# Patient Record
Sex: FEMALE | Race: BLACK OR AFRICAN AMERICAN | NOT HISPANIC OR LATINO | Employment: OTHER | URBAN - METROPOLITAN AREA
[De-identification: names, ages, dates, MRNs, and addresses within clinical notes are randomized per-mention and may not be internally consistent; named-entity substitution may affect disease eponyms.]

---

## 2019-08-22 ENCOUNTER — HOSPITAL ENCOUNTER (EMERGENCY)
Facility: HOSPITAL | Age: 64
Discharge: HOME | End: 2019-08-22
Attending: EMERGENCY MEDICINE | Admitting: EMERGENCY MEDICINE
Payer: COMMERCIAL

## 2019-08-22 VITALS
TEMPERATURE: 97.9 F | HEART RATE: 98 BPM | WEIGHT: 112.1 LBS | SYSTOLIC BLOOD PRESSURE: 153 MMHG | RESPIRATION RATE: 16 BRPM | OXYGEN SATURATION: 99 % | DIASTOLIC BLOOD PRESSURE: 95 MMHG

## 2019-08-22 DIAGNOSIS — R53.1 GENERALIZED WEAKNESS: ICD-10-CM

## 2019-08-22 DIAGNOSIS — R68.89 MULTIPLE COMPLAINTS: Primary | ICD-10-CM

## 2019-08-22 DIAGNOSIS — R20.2 PARESTHESIA OF RIGHT UPPER EXTREMITY: ICD-10-CM

## 2019-08-22 LAB
ALBUMIN SERPL BCP-MCNC: 4.4 G/DL (ref 3–5.2)
ALP SERPL-CCNC: 52 U/L (ref 43–122)
ALT SERPL W P-5'-P-CCNC: 16 U/L (ref 9–52)
ANION GAP SERPL CALCULATED.3IONS-SCNC: 5 MMOL/L (ref 5–14)
AST SERPL W P-5'-P-CCNC: 23 U/L (ref 14–36)
BACTERIA UR QL AUTO: ABNORMAL /HPF
BASOPHILS # BLD AUTO: 0.1 THOUSANDS/ΜL (ref 0–0.1)
BASOPHILS NFR BLD AUTO: 2 % (ref 0–1)
BILIRUB SERPL-MCNC: 0.4 MG/DL
BILIRUB UR QL STRIP: NEGATIVE
BUN SERPL-MCNC: 13 MG/DL (ref 5–25)
CALCIUM SERPL-MCNC: 9.7 MG/DL (ref 8.4–10.2)
CHLORIDE SERPL-SCNC: 105 MMOL/L (ref 97–108)
CLARITY UR: CLEAR
CO2 SERPL-SCNC: 31 MMOL/L (ref 22–30)
COLOR UR: YELLOW
CREAT SERPL-MCNC: 0.84 MG/DL (ref 0.6–1.2)
EOSINOPHIL # BLD AUTO: 0.2 THOUSAND/ΜL (ref 0–0.4)
EOSINOPHIL NFR BLD AUTO: 4 % (ref 0–6)
ERYTHROCYTE [DISTWIDTH] IN BLOOD BY AUTOMATED COUNT: 13.7 %
GFR SERPL CREATININE-BSD FRML MDRD: 86 ML/MIN/1.73SQ M
GLUCOSE SERPL-MCNC: 94 MG/DL (ref 70–99)
GLUCOSE UR STRIP-MCNC: NEGATIVE MG/DL
HCT VFR BLD AUTO: 39.4 % (ref 36–46)
HGB BLD-MCNC: 13.1 G/DL (ref 12–16)
HGB UR QL STRIP.AUTO: 150
KETONES UR STRIP-MCNC: NEGATIVE MG/DL
LEUKOCYTE ESTERASE UR QL STRIP: 25
LIPASE SERPL-CCNC: 98 U/L (ref 23–300)
LYMPHOCYTES # BLD AUTO: 2.1 THOUSANDS/ΜL (ref 0.5–4)
LYMPHOCYTES NFR BLD AUTO: 38 % (ref 25–45)
MCH RBC QN AUTO: 30.1 PG (ref 26–34)
MCHC RBC AUTO-ENTMCNC: 33.2 G/DL (ref 31–36)
MCV RBC AUTO: 91 FL (ref 80–100)
MONOCYTES # BLD AUTO: 0.4 THOUSAND/ΜL (ref 0.2–0.9)
MONOCYTES NFR BLD AUTO: 8 % (ref 1–10)
MUCOUS THREADS UR QL AUTO: ABNORMAL
NEUTROPHILS # BLD AUTO: 2.7 THOUSANDS/ΜL (ref 1.8–7.8)
NEUTS SEG NFR BLD AUTO: 49 % (ref 45–65)
NITRITE UR QL STRIP: NEGATIVE
NON-SQ EPI CELLS URNS QL MICRO: ABNORMAL /HPF
PH UR STRIP.AUTO: 6 [PH]
PLATELET # BLD AUTO: 292 THOUSANDS/UL (ref 150–450)
PMV BLD AUTO: 7.2 FL (ref 8.9–12.7)
POTASSIUM SERPL-SCNC: 3.8 MMOL/L (ref 3.6–5)
PROT SERPL-MCNC: 7.7 G/DL (ref 5.9–8.4)
PROT UR STRIP-MCNC: NEGATIVE MG/DL
RBC # BLD AUTO: 4.34 MILLION/UL (ref 4–5.2)
RBC #/AREA URNS AUTO: ABNORMAL /HPF
SODIUM SERPL-SCNC: 141 MMOL/L (ref 137–147)
SP GR UR STRIP.AUTO: 1.01 (ref 1–1.04)
TROPONIN I SERPL-MCNC: <0.01 NG/ML (ref 0–0.03)
UROBILINOGEN UA: NEGATIVE MG/DL
WBC # BLD AUTO: 5.5 THOUSAND/UL (ref 4.5–11)
WBC #/AREA URNS AUTO: ABNORMAL /HPF

## 2019-08-22 PROCEDURE — 99283 EMERGENCY DEPT VISIT LOW MDM: CPT | Performed by: PHYSICIAN ASSISTANT

## 2019-08-22 PROCEDURE — 99285 EMERGENCY DEPT VISIT HI MDM: CPT

## 2019-08-22 PROCEDURE — 36415 COLL VENOUS BLD VENIPUNCTURE: CPT | Performed by: PHYSICIAN ASSISTANT

## 2019-08-22 PROCEDURE — 85025 COMPLETE CBC W/AUTO DIFF WBC: CPT | Performed by: PHYSICIAN ASSISTANT

## 2019-08-22 PROCEDURE — 93005 ELECTROCARDIOGRAM TRACING: CPT

## 2019-08-22 PROCEDURE — 83690 ASSAY OF LIPASE: CPT | Performed by: PHYSICIAN ASSISTANT

## 2019-08-22 PROCEDURE — 84484 ASSAY OF TROPONIN QUANT: CPT | Performed by: PHYSICIAN ASSISTANT

## 2019-08-22 PROCEDURE — 80053 COMPREHEN METABOLIC PANEL: CPT | Performed by: PHYSICIAN ASSISTANT

## 2019-08-22 PROCEDURE — 81001 URINALYSIS AUTO W/SCOPE: CPT | Performed by: PHYSICIAN ASSISTANT

## 2019-08-22 RX ORDER — PRAVASTATIN SODIUM 40 MG
40 TABLET ORAL EVERY OTHER DAY
COMMUNITY
Start: 2019-07-10 | End: 2019-09-06 | Stop reason: SINTOL

## 2019-08-22 RX ORDER — FLUTICASONE FUROATE AND VILANTEROL 200; 25 UG/1; UG/1
1 POWDER RESPIRATORY (INHALATION) DAILY
COMMUNITY
Start: 2019-05-16

## 2019-08-22 RX ORDER — PANTOPRAZOLE SODIUM 20 MG/1
20 TABLET, DELAYED RELEASE ORAL DAILY
COMMUNITY
Start: 2018-11-22

## 2019-08-22 RX ORDER — ALBUTEROL SULFATE 90 UG/1
2 AEROSOL, METERED RESPIRATORY (INHALATION) EVERY 6 HOURS PRN
COMMUNITY
Start: 2018-11-19 | End: 2019-11-19

## 2019-08-22 RX ORDER — FLUTICASONE PROPIONATE 50 MCG
2 SPRAY, SUSPENSION (ML) NASAL DAILY
COMMUNITY
Start: 2019-05-16 | End: 2020-05-15

## 2019-08-22 RX ORDER — LORATADINE 10 MG/1
10 TABLET ORAL DAILY
COMMUNITY
Start: 2018-11-22

## 2019-08-22 RX ORDER — ONDANSETRON 4 MG/1
4 TABLET, ORALLY DISINTEGRATING ORAL EVERY 8 HOURS PRN
Qty: 20 TABLET | Refills: 0 | Status: SHIPPED | OUTPATIENT
Start: 2019-08-22 | End: 2019-09-01

## 2019-08-22 NOTE — ED PROVIDER NOTES
History  Chief Complaint   Patient presents with    Weakness - Generalized     pt  with many c/o's - lost sense of smell, lost sense of taste , not eating, losing weight, have an enlarged liver , feel weak all over , rt  arm felt numb yesterday - doesn't like her doctor - one part of my leg feels like that the blood doesn't circulat     Patient is a 27-year-old female who presents today with multiple chronic complaints  Patient is complaining of decreased appetite over the past year, right-sided leg pain over the past 3 years and changes to activity level over the past year as well  Patient reports she is most concerned for an episode of numbness and tingling in the right antecubital fossa which occurred yesterday  Patient reports she has had multiple occurrences of this numbness and tingling previously  Patient reports she is also concerned she was diagnosed with an enlarged liver and has had a decreased appetite over the past year and reports she has been losing weight  Patient reports she has had episodes of nausea associated with this weight loss however denies any vomiting, diarrhea, constipation, crow-colored stools  Or abdominal pain  Patient reports she does not have any complaints here today and is reporting that she does not have a good relationship with her family care doctor and would like information for a different doctor  Patient denies any headaches, lightheadedness, dizziness, chest pain, shortness of breath, abdominal pain, dysuria, hematuria, flank pain, fevers, chills, rashes  Patient is reporting she does not have any symptoms at evaluation here today        History provided by:  Patient   used: No    Malaise - 7 years or greater   Severity:  Mild  Onset quality:  Gradual  Duration:  156 weeks  Timing:  Constant  Progression:  Unchanged  Chronicity:  New  Relieved by:  None tried  Worsened by:  Nothing  Ineffective treatments:  None tried  Associated symptoms: nausea ( associated with 'forcing' herself to eat large amoutns of food)    Associated symptoms: no abdominal pain, no chest pain, no dizziness, no dysuria, no fever, no shortness of breath and no vomiting        Prior to Admission Medications   Prescriptions Last Dose Informant Patient Reported? Taking? albuterol (PROVENTIL HFA,VENTOLIN HFA) 90 mcg/act inhaler Past Week at Unknown time  Yes Yes   Sig: Inhale 2 puffs every 6 (six) hours as needed   fluticasone (FLONASE) 50 mcg/act nasal spray Past Month at Unknown time  Yes Yes   Si sprays into each nostril daily   fluticasone-vilanterol (BREO ELLIPTA) 200-25 MCG/INH inhaler 2019 at Unknown time  Yes Yes   Sig: Inhale 1 puff daily   loratadine (CLARITIN) 10 mg tablet Past Week at Unknown time  Yes Yes   Sig: Take 10 mg by mouth daily   pantoprazole (PROTONIX) 20 mg tablet 2019 at Unknown time  Yes Yes   Sig: Take 20 mg by mouth daily   pravastatin (PRAVACHOL) 40 mg tablet 2019 at Unknown time  Yes Yes   Sig: Take 40 mg by mouth daily      Facility-Administered Medications: None       Past Medical History:   Diagnosis Date    Asthma     Enlarged liver        History reviewed  No pertinent surgical history  History reviewed  No pertinent family history  I have reviewed and agree with the history as documented  Social History     Tobacco Use    Smoking status: Never Smoker    Smokeless tobacco: Never Used   Substance Use Topics    Alcohol use: Not Currently     Frequency: Never    Drug use: Never        Review of Systems   Constitutional: Positive for appetite change, fatigue and unexpected weight change ( weight loss over past year)  Negative for chills and fever  HENT: Negative for congestion, ear pain, rhinorrhea and sore throat  Eyes: Negative for redness  Respiratory: Negative for chest tightness and shortness of breath  Cardiovascular: Negative for chest pain and palpitations     Gastrointestinal: Positive for nausea ( associated with 'forcing' herself to eat large amoutns of food)  Negative for abdominal pain and vomiting  Genitourinary: Negative for dysuria and hematuria  Musculoskeletal: Negative  Skin: Negative for rash  Neurological: Positive for weakness ( generalized) and numbness ( and tingling in right ac fossa less than 1 day multiple occurrences over past year)  Negative for dizziness, syncope and light-headedness  Hematological: Negative for adenopathy  Does not bruise/bleed easily  Physical Exam  Physical Exam   Constitutional: She is oriented to person, place, and time  She appears well-developed and well-nourished  Well-appearing  female in no acute distress   HENT:   Head: Normocephalic  Eyes: No scleral icterus  Cardiovascular: Normal rate and regular rhythm  Pulmonary/Chest: Effort normal and breath sounds normal  No stridor  Abdominal: Soft  She exhibits no distension  There is no tenderness  Musculoskeletal: Normal range of motion  Neurological: She is alert and oriented to person, place, and time  Skin: Skin is warm and dry  Capillary refill takes less than 2 seconds  Psychiatric: She has a normal mood and affect  Nursing note and vitals reviewed        Vital Signs  ED Triage Vitals [08/22/19 1459]   Temperature Pulse Respirations Blood Pressure SpO2   97 9 °F (36 6 °C) 98 16 153/95 99 %      Temp Source Heart Rate Source Patient Position - Orthostatic VS BP Location FiO2 (%)   Tympanic Monitor Sitting Left arm --      Pain Score       --           Vitals:    08/22/19 1459   BP: 153/95   Pulse: 98   Patient Position - Orthostatic VS: Sitting         Visual Acuity      ED Medications  Medications - No data to display    Diagnostic Studies  Results Reviewed     Procedure Component Value Units Date/Time    Urine Microscopic [[id number]]  (Abnormal) Collected:  08/22/19 1435    Lab Status:  Final result Specimen:  Urine, Clean Catch Updated:  08/22/19 4150 RBC, UA 4-10 /hpf      WBC, UA 1-2 /hpf      Epithelial Cells Occasional /hpf      Bacteria, UA Occasional /hpf      MUCUS THREADS Occasional    Troponin I [[id number]]  (Normal) Collected:  08/22/19 1542    Lab Status:  Final result Specimen:  Blood from Arm, Right Updated:  08/22/19 1617     Troponin I <0 01 ng/mL     UA w Reflex to Microscopic [[id number]]  (Abnormal) Collected:  08/22/19 1555    Lab Status:  Final result Specimen:  Urine, Clean Catch Updated:  08/22/19 1614     Color, UA Yellow     Clarity, UA Clear     Specific Gravity, UA 1 015     pH, UA 6 0     Leukocytes, UA 25 0     Nitrite, UA Negative     Protein, UA Negative mg/dl      Glucose, UA Negative mg/dl      Ketones, UA Negative mg/dl      Bilirubin, UA Negative     Blood,  0     UROBILINOGEN UA Negative mg/dL     Lipase [[id number]]  (Normal) Collected:  08/22/19 1542    Lab Status:  Final result Specimen:  Blood from Arm, Right Updated:  08/22/19 1605     Lipase 98 u/L     Comprehensive metabolic panel [[id number]]  (Abnormal) Collected:  08/22/19 1542    Lab Status:  Final result Specimen:  Blood from Arm, Right Updated:  08/22/19 1605     Sodium 141 mmol/L      Potassium 3 8 mmol/L      Chloride 105 mmol/L      CO2 31 mmol/L      ANION GAP 5 mmol/L      BUN 13 mg/dL      Creatinine 0 84 mg/dL      Glucose 94 mg/dL      Calcium 9 7 mg/dL      AST 23 U/L      ALT 16 U/L      Alkaline Phosphatase 52 U/L      Total Protein 7 7 g/dL      Albumin 4 4 g/dL      Total Bilirubin 0 40 mg/dL      eGFR 86 ml/min/1 73sq m     Narrative:       [facility] [place] operated by [facility] guidelines for Chronic Kidney Disease (CKD):     Stage 1 with normal or high GFR (GFR > 90 mL/min/1 73 square meters)    Stage 2 Mild CKD (GFR = 60-89 mL/min/1 73 square meters)    Stage 3A Moderate CKD (GFR = 45-59 mL/min/1 73 square meters)    Stage 3B Moderate CKD (GFR = 30-44 mL/min/1 73 square meters)    Stage 4 Severe CKD (GFR = 15-29 mL/min/1 73 square meters)   Stage 5 End Stage CKD (GFR <15 mL/min/1 73 square meters)  Note: GFR calculation is accurate only with a steady state creatinine    CBC and differential [[id number]]  (Abnormal) Collected:  08/22/19 1542    Lab Status:  Final result Specimen:  Blood from Arm, Right Updated:  08/22/19 1601     WBC 5 50 Thousand/uL      RBC 4 34 Million/uL      Hemoglobin 13 1 g/dL      Hematocrit 39 4 %      MCV 91 fL      MCH 30 1 pg      MCHC 33 2 g/dL      RDW 13 7 %      MPV 7 2 fL      Platelets 714 Thousands/uL      Neutrophils Relative 49 %      Lymphocytes Relative 38 %      Monocytes Relative 8 %      Eosinophils Relative 4 %      Basophils Relative 2 %      Neutrophils Absolute 2 70 Thousands/µL      Lymphocytes Absolute 2 10 Thousands/µL      Monocytes Absolute 0 40 Thousand/µL      Eosinophils Absolute 0 20 Thousand/µL      Basophils Absolute 0 10 Thousands/µL                  No orders to display              Procedures  ECG 12 Lead Documentation Only  Date/Time: 8/22/2019 5:37 PM  Performed by: Edith June PA-C  Authorized by: Edith June PA-C     Indications / Diagnosis:  Episode of numbness / tingling   ECG reviewed by me, the ED Provider: no    Patient location:  ED  Previous ECG:     Previous ECG:  Unavailable    Comparison to cardiac monitor: No    Interpretation:     Interpretation: normal    Rate:     ECG rate:  76    ECG rate assessment: normal    Rhythm:     Rhythm: sinus rhythm    Ectopy:     Ectopy: none    QRS:     QRS axis:  Left    QRS intervals:  Normal  Conduction:     Conduction: normal    ST segments:     ST segments:  Normal  T waves:     T waves: normal    Comments:                 ED Course             Stroke Assessment     Row Name 08/22/19 1535             NIH Stroke Scale    Interval  Baseline      Level of Consciousness (1a )  0      LOC Questions (1b )  0      LOC Commands (1c )  0      Best Gaze (2 )  0      Visual (3 )  0      Facial Palsy (4 )  0      Motor Arm, Left (5a )  0      Motor Arm, Right (5b )  0      Motor Leg, Left (6a )  0      Motor Leg, Right (6b )  0      Limb Ataxia (7 )  0      Sensory (8 )  0      Best Language (9 )  0      Dysarthria (10 )  0      Extinction and Inattention (11 ) (Formerly Neglect)  0      Total  0          First Filed Value   TPA Decision  Patient not a TPA candidate  Patient is not a candidate options  Symptoms resolved/clearly non disabling  MDM    Disposition  Final diagnoses:   Multiple complaints   Generalized weakness   Paresthesia of right upper extremity     Time reflects when diagnosis was documented in both MDM as applicable and the Disposition within this note     Time User Action Codes Description Comment    8/22/2019  4:18 PM Cynthia Thorne [R68 89] Multiple complaints     8/22/2019  4:18 PM Liudmila Hualyssa Add [R53 1] Generalized weakness     8/22/2019  4:19 PM Ginger Paigeanita [R20 2] Paresthesia of right upper extremity       ED Disposition     ED Disposition Condition Date/Time Comment    Discharge Good Thu Aug 22, 2019  4:18 PM Brendolyn Signs discharge to home/self care              Follow-up Information     Follow up With Specialties Details Why Contact Info Additional Information    [address] [facility] Schedule an appointment as soon as possible for a visit in 2 days  [address] [id number] [phone] [facility], [address] [place], [place], [phone]          Discharge Medication List as of 8/22/2019  4:21 PM      CONTINUE these medications which have NOT CHANGED    Details   albuterol (PROVENTIL HFA,VENTOLIN HFA) 90 mcg/act inhaler Inhale 2 puffs every 6 (six) hours as needed, Starting Mon 11/19/2018, Until Tue 11/19/2019, Historical Med      fluticasone (FLONASE) 50 mcg/act nasal spray 2 sprays into each nostril daily, Starting Thu 5/16/2019, Until Fri 5/15/2020, Historical Med      fluticasone-vilanterol (BREO ELLIPTA) 200-25 MCG/INH inhaler Inhale 1 puff daily, Starting Thu 5/16/2019, Historical Med      loratadine (CLARITIN) 10 mg tablet Take 10 mg by mouth daily, Starting Thu 11/22/2018, Historical Med      pantoprazole (PROTONIX) 20 mg tablet Take 20 mg by mouth daily, Starting Thu 11/22/2018, Historical Med      pravastatin (PRAVACHOL) 40 mg tablet Take 40 mg by mouth daily, Starting Wed 7/10/2019, Historical Med           No discharge procedures on file      ED Provider  Electronically Signed by           Lexi Yeager PA-C  08/22/19 6173

## 2019-08-23 LAB
ATRIAL RATE: 76 BPM
P AXIS: 50 DEGREES
PR INTERVAL: 180 MS
QRS AXIS: -6 DEGREES
QRSD INTERVAL: 98 MS
QT INTERVAL: 418 MS
QTC INTERVAL: 470 MS
T WAVE AXIS: 25 DEGREES
VENTRICULAR RATE: 76 BPM

## 2019-08-23 PROCEDURE — 93010 ELECTROCARDIOGRAM REPORT: CPT | Performed by: INTERNAL MEDICINE

## 2019-09-06 ENCOUNTER — OFFICE VISIT (OUTPATIENT)
Dept: FAMILY MEDICINE CLINIC | Facility: CLINIC | Age: 64
End: 2019-09-06
Payer: COMMERCIAL

## 2019-09-06 VITALS
HEART RATE: 83 BPM | BODY MASS INDEX: 22.98 KG/M2 | OXYGEN SATURATION: 100 % | WEIGHT: 114 LBS | HEIGHT: 59 IN | DIASTOLIC BLOOD PRESSURE: 68 MMHG | SYSTOLIC BLOOD PRESSURE: 128 MMHG

## 2019-09-06 DIAGNOSIS — R16.0 LARGE LIVER: ICD-10-CM

## 2019-09-06 DIAGNOSIS — Z86.59 PERSONAL HISTORY OF MENTAL DISORDER: ICD-10-CM

## 2019-09-06 DIAGNOSIS — Z12.31 ENCOUNTER FOR SCREENING MAMMOGRAM FOR BREAST CANCER: ICD-10-CM

## 2019-09-06 DIAGNOSIS — J30.1 SEASONAL ALLERGIC RHINITIS DUE TO POLLEN: ICD-10-CM

## 2019-09-06 DIAGNOSIS — N63.20 BREAST MASS, LEFT: ICD-10-CM

## 2019-09-06 DIAGNOSIS — E78.49 OTHER HYPERLIPIDEMIA: ICD-10-CM

## 2019-09-06 DIAGNOSIS — J45.41 MODERATE PERSISTENT ASTHMA WITH ACUTE EXACERBATION: ICD-10-CM

## 2019-09-06 DIAGNOSIS — R63.4 ABNORMAL WEIGHT LOSS: Primary | ICD-10-CM

## 2019-09-06 PROBLEM — K62.5 BLOOD PER RECTUM: Status: ACTIVE | Noted: 2018-04-12

## 2019-09-06 PROBLEM — R13.10 DYSPHAGIA: Status: ACTIVE | Noted: 2018-07-09

## 2019-09-06 PROBLEM — J98.4 RESTRICTIVE LUNG DISEASE: Status: ACTIVE | Noted: 2019-01-29

## 2019-09-06 PROBLEM — R51.9 CHRONIC HEADACHE: Status: ACTIVE | Noted: 2018-04-12

## 2019-09-06 PROBLEM — K21.9 GERD (GASTROESOPHAGEAL REFLUX DISEASE): Status: ACTIVE | Noted: 2018-11-19

## 2019-09-06 PROBLEM — R87.810 CERVICAL HIGH RISK HPV (HUMAN PAPILLOMAVIRUS) TEST POSITIVE: Status: ACTIVE | Noted: 2018-09-13

## 2019-09-06 PROBLEM — G89.29 CHRONIC HEADACHE: Status: ACTIVE | Noted: 2018-04-12

## 2019-09-06 PROBLEM — R05.9 COUGH: Status: ACTIVE | Noted: 2018-04-12

## 2019-09-06 PROBLEM — K42.9 UMBILICAL HERNIA WITHOUT OBSTRUCTION AND WITHOUT GANGRENE: Status: ACTIVE | Noted: 2017-12-19

## 2019-09-06 PROBLEM — M25.50 JOINT PAIN: Status: ACTIVE | Noted: 2018-11-19

## 2019-09-06 PROBLEM — Z12.11 ENCOUNTER FOR SCREENING FOR MALIGNANT NEOPLASM OF COLON: Status: ACTIVE | Noted: 2018-07-09

## 2019-09-06 PROBLEM — M79.89 AXILLARY SWELLING: Status: ACTIVE | Noted: 2018-05-10

## 2019-09-06 PROCEDURE — 3008F BODY MASS INDEX DOCD: CPT | Performed by: INTERNAL MEDICINE

## 2019-09-06 PROCEDURE — 99215 OFFICE O/P EST HI 40 MIN: CPT | Performed by: INTERNAL MEDICINE

## 2019-09-06 NOTE — PROGRESS NOTES
Assessment/Plan: This 27-year-old female presented with multiple somatic complaints and anxiety about multiple tests in the past that she says were not fully explained to her  I took will lot of time to review the patient's tests  I spent in excess of 40 minutes with the patient with greater than 50% of the time spent counseling her  I review all her past studies with her that were done at [facility] in the past her MRIs and CT scans and ultrasounds  She does not have lymphadenopathy she has normal size lymph nodes on the studies  She does not have a breast mass but has what is described as an large tail of the left breast   She is due for mammogram at this time which we will obtain half necessary they will do further studies  I reassured the patient that her other testing was normal and carefully explained this to her  She does have history of a an enlarged liver with the presence of multiple small colloid cysts on CT scan at [facility] in 2017 which according to the radiology department did not require follow-up  Liver function tests were ordered at this time and will be reviewed with the patient at her next visit  The patient appears depressed and over burned with the care of her mother who she is very concerned about and who has Alzheimer's is at least moderately severe  It is very possible that the recurrence of the patient's depression has been triggered and possibility present in the LEs severe for fashion for many years especially since she stopped her prior medication several years ago  She is now refusing medication and I told her we could discuss this further if she wished  Diet reviewed  Lifestyle modifications reviewed  Medications reviewed and ordered  Laboratory tests and studies reviewed and ordered  All patient's questions answered to patient satisfaction      Chief Complaint   Patient presents with   [address]     pt was going to [facility] clinic and was not happy with the dr's    poor sence of smell     past 6 months    lack of appitite     past 6 months    Weight Loss     past 6 months    Lymphadenopathy     pt reports having them under her arm and in neck   Fatigue         Diagnoses and all orders for this visit:    Abnormal weight loss  Comments:  loss of appetite and smell and taste  Told her may be depression , will check thyroid first  Put on wt gaining diet  2-3000 donald /d  Orders:  -     TSH, 3rd generation with Free T4 reflex; Future  -     Comprehensive metabolic panel; Future  -     CBC and differential; Future  -     LDL cholesterol, direct; Future    Encounter for screening mammogram for breast cancer  -     Mammo screening bilateral w 3d & cad; Future    Personal history of mental disorder  Comments:  hx of depression aand bipolar disorder, eventually stopped welbutrin and risperdol  Likely depressed again , may accout for wt loss, declining medical therapy  Other hyperlipidemia  Comments:  check off pravastatin which pt refuses  Orders:  -     TSH, 3rd generation with Free T4 reflex; Future  -     Comprehensive metabolic panel; Future  -     CBC and differential; Future  -     LDL cholesterol, direct; Future    Large liver  Comments:  hepatomegaly with small colloid cysts 2017 [facility]    Moderate persistent asthma with acute exacerbation    Seasonal allergic rhinitis due to pollen    Breast mass, left        Subjective: This 26-year-old female is seen for the following: This is a 1st visit and the patient multiple somatic complaints which required that I review all of her studies from [facility] dating back several years with her and convince her that the results were unrevealing as she was convinced that she was dying  She has apparently been very upset about this as well as the fact that she is caring for her elderly mother who lives with her and has Alzheimer's at least moderately advanced    She has lost 35 lb in approximately 6 months to year  She is now at a normal weight  She states that food does not taste good and she has lost her sense of taste and smell and does need very much  She has a past history of severe depression and possibly bipolar disorder and was on Risperdal and Wellbutrin for many years in the past and stopped it several years ago on her own  She moved to the [facility] from the [facility] work area 6 months ago  She appears to be paranoia about people who live around her and her landlord and does not want anything to do with other people  She has not wanted to hurt herself and has never attempted to hurt herself  She has a history of elevated cholesterol past has been on pravastatin but says that she was concerned because she is having trouble remembering things that the pravastatin might have been causing this  Last time a cholesterol check was checked her LDL is 133 on 40 mg of pravastatin every other day  She has a history of mild asthma and uses Breo as needed  She also has nasal allergies and nasal obstruction which may affect her smell  This is worse since she has lived in the [facility] and for the year prior to moving here she had a mold problems to her apartment  Patient ID: Gaston Bateman is a 59 y o  female          Current Outpatient Medications:     albuterol (PROVENTIL HFA,VENTOLIN HFA) 90 mcg/act inhaler, Inhale 2 puffs every 6 (six) hours as needed, Disp: , Rfl:     fluticasone (FLONASE) 50 mcg/act nasal spray, 2 sprays into each nostril daily, Disp: , Rfl:     fluticasone-vilanterol (BREO ELLIPTA) 200-25 MCG/INH inhaler, Inhale 1 puff daily, Disp: , Rfl:     loratadine (CLARITIN) 10 mg tablet, Take 10 mg by mouth daily, Disp: , Rfl:     pantoprazole (PROTONIX) 20 mg tablet, Take 20 mg by mouth daily, Disp: , Rfl:     ondansetron (ZOFRAN-ODT) 4 mg disintegrating tablet, Take 1 tablet (4 mg total) by mouth every 8 (eight) hours as needed for nausea for up to 10 days, Disp: 20 tablet, Rfl: 0    The following portions of the patient's history were reviewed and updated as appropriate: allergies, current medications, past family history, past medical history, past social history, past surgical history and problem list     Review of Systems   Constitutional: Negative for appetite change, fatigue, fever and unexpected weight change  HENT: Negative for rhinorrhea, sinus pressure, sinus pain, sneezing and sore throat  Eyes: Negative for visual disturbance  Respiratory: Negative for cough, chest tightness, shortness of breath and wheezing  Cardiovascular: Negative for chest pain, palpitations and leg swelling  Gastrointestinal: Negative for abdominal distention, abdominal pain, blood in stool, constipation, diarrhea, nausea and vomiting  Endocrine: Negative for heat intolerance and polyuria  Genitourinary: Negative for difficulty urinating, dyspareunia, hematuria, vaginal discharge and vaginal pain  Musculoskeletal: Negative for arthralgias, back pain and joint swelling  Skin: Negative for rash  Allergic/Immunologic: Negative for environmental allergies  Neurological: Negative for tremors, facial asymmetry, weakness, light-headedness and headaches  Hematological: Does not bruise/bleed easily  Psychiatric/Behavioral: Negative for agitation, behavioral problems, confusion and dysphoric mood  The patient is not nervous/anxious  History reviewed  No pertinent family history  Past Medical History:   Diagnosis Date    Asthma     Enlarged liver        History reviewed  No pertinent surgical history      Social History     Socioeconomic History    Marital status: Single     Spouse name: None    Number of children: None    Years of education: None    Highest education level: None   Occupational History    None   Social Needs    Financial resource strain: None    Food insecurity:     Worry: None     Inability: None    Transportation needs:     Medical: None     Non-medical: None   Tobacco Use    Smoking status: Never Smoker    Smokeless tobacco: Never Used   Substance and Sexual Activity    Alcohol use: Not Currently     Frequency: Never    Drug use: Never    Sexual activity: None   Lifestyle    Physical activity:     Days per week: None     Minutes per session: None    Stress: None   Relationships    Social connections:     Talks on phone: None     Gets together: None     Attends Jainism service: None     Active member of club or organization: None     Attends meetings of clubs or organizations: None     Relationship status: None    Intimate partner violence:     Fear of current or ex partner: None     Emotionally abused: None     Physically abused: None     Forced sexual activity: None   Other Topics Concern    None   Social History Narrative    None       No Known Allergies    Depression Screening Follow-up Plan: Patient's depression screening was positive with a PHQ-2 score of   Their PHQ-9 score was   Patient assessed for underlying major depression  They have no active suicidal ideations  Brief counseling provided and recommend additional follow-up/re-evaluation next office visit  Objective:    /68 (BP Location: Right arm, Patient Position: Sitting, Cuff Size: Standard)   Pulse 83   Ht 4' 11" (1 499 m)   Wt 51 7 kg (114 lb)   LMP  (LMP Unknown)   SpO2 100%   BMI 23 03 kg/m²        Physical Exam   Constitutional: She is oriented to person, place, and time  She appears well-developed and well-nourished  HENT:   Head: Normocephalic and atraumatic  Nose: Nose normal    Mouth/Throat: Oropharynx is clear and moist  No oropharyngeal exudate  Eyes: No scleral icterus  Neck: Normal range of motion  Neck supple  No JVD present  No tracheal deviation present  No thyromegaly present  Cardiovascular: Normal rate, regular rhythm and normal heart sounds  Exam reveals no gallop and no friction rub  No murmur heard  Pulmonary/Chest: Effort normal  No respiratory distress  She has no wheezes  She has no rales  She exhibits no tenderness  Abdominal: Soft  Bowel sounds are normal  She exhibits no distension and no mass  There is no tenderness  There is no rebound and no guarding  No hernia  Musculoskeletal: Normal range of motion  She exhibits no edema or deformity  Lymphadenopathy:     She has no cervical adenopathy  Neurological: She is alert and oriented to person, place, and time  No cranial nerve deficit  Coordination normal    Skin: Skin is warm and dry  No pallor  Psychiatric: She has a normal mood and affect   Her behavior is normal  Judgment and thought content normal

## 2021-03-22 ENCOUNTER — VBI (OUTPATIENT)
Dept: ADMINISTRATIVE | Facility: OTHER | Age: 66
End: 2021-03-22

## 2022-05-02 ENCOUNTER — TELEPHONE (OUTPATIENT)
Dept: ADMINISTRATIVE | Facility: OTHER | Age: 67
End: 2022-05-02

## 2022-05-02 NOTE — TELEPHONE ENCOUNTER
----- Message from Darshan Matthews sent at 5/2/2022  6:35 AM EDT -----  Regarding: CARE GAP REQUEST  05/02/22 6:35 AM    Hello, our patient attached above has had Mammogram completed/performed  Please assist in updating the patient chart by pulling the Care Everywhere (CE) document  The date of service is 4/29/22       Thank you,  Darshan Matthews  [facility]

## 2022-05-02 NOTE — TELEPHONE ENCOUNTER
Upon review of the In Basket request we were able to locate, review, and update the patient chart as requested for Mammogram     Mammogram from 4-29-22 is only the left breast and does not qualify to update  because patient does not have a right mastectomy  However, I found a bilateral mammogram dated 11-19-21 and I did upload to   Patient has not had an appointment with [facility] since 9-6-19 and does not have any upcoming appointment  Patient seems to follow DR HUNTER MATAMOROS [facility] for family medicine now   does not show any measures  Any additional questions or concerns should be emailed to the Practice Liaisons via [email or web address] email, please do not reply via In Basket      Thank you  Christofer Rangel